# Patient Record
Sex: FEMALE | Race: WHITE | URBAN - METROPOLITAN AREA
[De-identification: names, ages, dates, MRNs, and addresses within clinical notes are randomized per-mention and may not be internally consistent; named-entity substitution may affect disease eponyms.]

---

## 2023-02-28 ENCOUNTER — OFFICE VISIT (OUTPATIENT)
Dept: URBAN - METROPOLITAN AREA CLINIC 113 | Facility: CLINIC | Age: 53
End: 2023-02-28
Payer: COMMERCIAL

## 2023-02-28 ENCOUNTER — WEB ENCOUNTER (OUTPATIENT)
Dept: URBAN - METROPOLITAN AREA CLINIC 113 | Facility: CLINIC | Age: 53
End: 2023-02-28

## 2023-02-28 VITALS
BODY MASS INDEX: 17.58 KG/M2 | RESPIRATION RATE: 18 BRPM | HEIGHT: 64 IN | HEART RATE: 58 BPM | DIASTOLIC BLOOD PRESSURE: 74 MMHG | SYSTOLIC BLOOD PRESSURE: 114 MMHG | WEIGHT: 103 LBS | TEMPERATURE: 97.5 F

## 2023-02-28 DIAGNOSIS — R76.8 POSITIVE ANA (ANTINUCLEAR ANTIBODY): ICD-10-CM

## 2023-02-28 DIAGNOSIS — Z80.0 FAMILY HISTORY OF COLON CANCER: ICD-10-CM

## 2023-02-28 DIAGNOSIS — K58.0 IRRITABLE BOWEL SYNDROME WITH DIARRHEA: ICD-10-CM

## 2023-02-28 DIAGNOSIS — K76.0 NAFLD (NONALCOHOLIC FATTY LIVER DISEASE): ICD-10-CM

## 2023-02-28 DIAGNOSIS — R76.8 ANTIMITOCHONDRIAL ANTIBODY POSITIVE: ICD-10-CM

## 2023-02-28 PROBLEM — 1231824009: Status: ACTIVE | Noted: 2023-02-28

## 2023-02-28 PROBLEM — 197125005: Status: ACTIVE | Noted: 2023-02-28

## 2023-02-28 PROCEDURE — 76700 US EXAM ABDOM COMPLETE: CPT

## 2023-02-28 PROCEDURE — 99204 OFFICE O/P NEW MOD 45 MIN: CPT

## 2023-02-28 PROCEDURE — 99204 OFFICE O/P NEW MOD 45 MIN: CPT | Performed by: INTERNAL MEDICINE

## 2023-02-28 PROCEDURE — 76700 US EXAM ABDOM COMPLETE: CPT | Performed by: INTERNAL MEDICINE

## 2023-02-28 RX ORDER — HYDROXYCHLOROQUINE SULFATE 200 MG/1
AS DIRECTED TABLET, FILM COATED ORAL
Status: ACTIVE | COMMUNITY

## 2023-02-28 NOTE — HPI-TODAY'S VISIT:
52 year old female with a history of antiphospholipid syndrome, IBS and NAFLD presents for establishment of care.  She is a chronic disease . She visits patient's at their home and aids in chronic disease management with exercise. She was supposed to have a Hep B vaccination prior to moving to [place]. She had a positive Hep B surface antibody. She is concerned she was exposed at some point prior to moving however her Hep B core Ab is negative. She does have NAFLD. She barely drinks ETOH. She runs marathons. She "has the celiac gene but tests negative for celiac disease." She feels better when she eats a gluten free diet. She does have elevated IgM levels. She has low C3 and C4 levels. C3 was corrected with hydroxychloroquine. She has had "random" lab and urinalyses abnormalities. She has had positive ZAIN and positive AMA as well. She was told this is all attributed to lupus. She was established with a GI in [place]. She lives in [facility] currently. She has recently established care with a rheumatologist in [place] ([facility]).   She does have IBS-D. THis does not affect her quality of life. Fiber "kills her stomach." Tums and Meloxicam upset her stomach as well. She does have abdominal pain daily but not debilitating. This is generalized and may worsen after consuming FODMAP food.. She has occasional nausea without vomiting. Denies heartburn. She has occasional dysphagia to solid foods, no liquids. No odynophagia. Again this is not bothersome to her. Denies blood per rectum or melena. Her maternal uncle  of colon cancer, diagnosed at age 45. Her mother had diverticulitis in the small bowel. She tested negative for Tarango syndrome. Her father was diagnosed with esophageal cancer at age 80. Denies a family history of IBD. She has had many colonoscopies since she was 26 years old. She undergoes colonoscopies every five years. Her last EGD and colonoscopy were performed together in 2019. These were unremarkable. She saw a GI specialist in [place]  as well who performed EGD and colonoscopy which was unremarkable. She has a history of antiphospholipid syndrome. She needs to establish with heme-onc.

## 2023-02-28 NOTE — PHYSICAL EXAM GASTROINTESTINAL
soft, mild tenderness to palpation of RLQ without rebound or guarding, nondistended , normal bowel sounds

## 2023-03-01 LAB
IMMUNOGLOBULIN A, QN, SERUM: 152
INTERPRETATION: (no result)
T-TRANSGLUTAMINASE (TTG) IGA: <1

## 2023-03-10 ENCOUNTER — TELEPHONE ENCOUNTER (OUTPATIENT)
Dept: URBAN - METROPOLITAN AREA CLINIC 113 | Facility: CLINIC | Age: 53
End: 2023-03-10

## 2023-03-16 ENCOUNTER — WEB ENCOUNTER (OUTPATIENT)
Dept: URBAN - METROPOLITAN AREA CLINIC 113 | Facility: CLINIC | Age: 53
End: 2023-03-16

## 2023-04-27 ENCOUNTER — CLAIMS CREATED FROM THE CLAIM WINDOW (OUTPATIENT)
Dept: URBAN - METROPOLITAN AREA CLINIC 72 | Facility: CLINIC | Age: 53
End: 2023-04-27
Payer: COMMERCIAL

## 2023-04-27 ENCOUNTER — WEB ENCOUNTER (OUTPATIENT)
Dept: URBAN - METROPOLITAN AREA CLINIC 72 | Facility: CLINIC | Age: 53
End: 2023-04-27

## 2023-04-27 ENCOUNTER — LAB OUTSIDE AN ENCOUNTER (OUTPATIENT)
Dept: URBAN - METROPOLITAN AREA CLINIC 72 | Facility: CLINIC | Age: 53
End: 2023-04-27

## 2023-04-27 VITALS
WEIGHT: 102 LBS | BODY MASS INDEX: 17.42 KG/M2 | HEIGHT: 64 IN | HEART RATE: 64 BPM | DIASTOLIC BLOOD PRESSURE: 60 MMHG | SYSTOLIC BLOOD PRESSURE: 83 MMHG | TEMPERATURE: 97.5 F

## 2023-04-27 DIAGNOSIS — R10.13 EPIGASTRIC PAIN: ICD-10-CM

## 2023-04-27 DIAGNOSIS — R11.14 BILIOUS VOMITING WITH NAUSEA: ICD-10-CM

## 2023-04-27 DIAGNOSIS — K58.0 IRRITABLE BOWEL SYNDROME WITH DIARRHEA: ICD-10-CM

## 2023-04-27 PROCEDURE — 99214 OFFICE O/P EST MOD 30 MIN: CPT | Performed by: INTERNAL MEDICINE

## 2023-04-27 PROCEDURE — 99214 OFFICE O/P EST MOD 30 MIN: CPT | Performed by: NURSE PRACTITIONER

## 2023-04-27 RX ORDER — HYDROXYCHLOROQUINE SULFATE 200 MG/1
AS DIRECTED TABLET, FILM COATED ORAL
Status: ACTIVE | COMMUNITY

## 2023-04-27 RX ORDER — ESOMEPRAZOLE MAGNESIUM 20 MG/1
1 CAPSULE 30 MINUTES BEFORE BREAKFAST ON AN EMPTY STOMACH CAPSULE, DELAYED RELEASE ORAL ONCE A DAY
Qty: 30 | Refills: 1 | OUTPATIENT
Start: 2023-04-27

## 2023-04-27 NOTE — HPI-TODAY'S VISIT:
52-year-old female here for a follow-up appointment.  Was last seen 23 for IBS requesting liver tests with questionable history of nonalcoholic fatty liver disease.  She declined antispasmodic medication.  Has had multiple colonoscopies in the past.  Celiac panel ordered for prior positive testing of celiac gene but negative for celiac disease.  She is due for surveillance colonoscopy .  Right upper quadrant ultrasound with elastography ordered.  Right upper quadrant ultrasound with elastography 3/14/2023 was normal.  She is a chronic disease . She visits patient's at their home and aids in chronic disease management with exercise. She was supposed to have a Hep B vaccination prior to moving to [place]. She had a positive Hep B surface antibody. She is concerned she was exposed at some point prior to moving however her Hep B core Ab is negative. She does have NAFLD. She barely drinks ETOH. She runs marathons. She "has the celiac gene but tests negative for celiac disease." She feels better when she eats a gluten free diet. She does have elevated IgM levels. She has low C3 and C4 levels. C3 was corrected with hydroxychloroquine. She has had "random" lab and urinalyses abnormalities. She has had positive ZAIN and positive AMA as well. She was told this is all attributed to lupus. She was established with a GI in [place]. She lives in [facility] currently. She has recently established care with a rheumatologist in [place] ([facility]).   She does have IBS-D. THis does not affect her quality of life. Fiber "kills her stomach." Tums and Meloxicam upset her stomach as well. She does have abdominal pain daily but not debilitating. This is generalized and may worsen after consuming FODMAP food.. She has occasional nausea without vomiting. Denies heartburn. She has occasional dysphagia to solid foods, no liquids. No odynophagia. Again this is not bothersome to her. Denies blood per rectum or melena. Her maternal uncle  of colon cancer, diagnosed at age 45. Her mother had diverticulitis in the small bowel. She tested negative for Tarango syndrome. Her father was diagnosed with esophageal cancer at age 80. Denies a family history of IBD. She has had many colonoscopies since she was 26 years old. She undergoes colonoscopies every five years. Her last EGD and colonoscopy were performed together in 2019. These were unremarkable. She saw a GI specialist in [place]  as well who performed EGD and colonoscopy which was unremarkable. She has a history of antiphospholipid syndrome. She needs to establish with heme-onc.  On interterview today she reports on  she had LUQ pain, she woke up with it.  Had 2 episodes of diarrhea and then eneded up vomiting bile.  Slept 18-20 hours, not able to eat and was still having some pain even with water.  She is feeling better but pain is still present and is having some nausea.  No further vomiting or hematemesis.  No melena or hematochezia.  Stools are similar to her prior IBS-D symptoms.  Had a fever over the weekend.  Rare ETOH use.  Denies NSAIDs.  No GERD.

## 2023-04-27 NOTE — PHYSICAL EXAM GASTROINTESTINAL
soft, epigastric and LUQ tenderness, nondistended , normal bowel sounds, no guarding or rigidity, no rebound tenderness

## 2023-05-01 ENCOUNTER — TELEPHONE ENCOUNTER (OUTPATIENT)
Dept: URBAN - METROPOLITAN AREA CLINIC 72 | Facility: CLINIC | Age: 53
End: 2023-05-01

## 2023-05-02 ENCOUNTER — OFFICE VISIT (OUTPATIENT)
Dept: URBAN - METROPOLITAN AREA CLINIC 72 | Facility: CLINIC | Age: 53
End: 2023-05-02

## 2023-05-02 ENCOUNTER — TELEPHONE ENCOUNTER (OUTPATIENT)
Dept: URBAN - METROPOLITAN AREA CLINIC 72 | Facility: CLINIC | Age: 53
End: 2023-05-02

## 2023-05-04 ENCOUNTER — WEB ENCOUNTER (OUTPATIENT)
Dept: URBAN - METROPOLITAN AREA CLINIC 72 | Facility: CLINIC | Age: 53
End: 2023-05-04

## 2023-05-06 ENCOUNTER — WEB ENCOUNTER (OUTPATIENT)
Dept: URBAN - METROPOLITAN AREA CLINIC 72 | Facility: CLINIC | Age: 53
End: 2023-05-06

## 2023-05-08 ENCOUNTER — TELEPHONE ENCOUNTER (OUTPATIENT)
Dept: URBAN - METROPOLITAN AREA CLINIC 72 | Facility: CLINIC | Age: 53
End: 2023-05-08

## 2023-05-09 ENCOUNTER — WEB ENCOUNTER (OUTPATIENT)
Dept: URBAN - METROPOLITAN AREA CLINIC 72 | Facility: CLINIC | Age: 53
End: 2023-05-09

## 2023-05-12 ENCOUNTER — TELEPHONE ENCOUNTER (OUTPATIENT)
Dept: URBAN - METROPOLITAN AREA CLINIC 23 | Facility: CLINIC | Age: 53
End: 2023-05-12

## 2023-10-24 ENCOUNTER — WEB ENCOUNTER (OUTPATIENT)
Dept: URBAN - METROPOLITAN AREA CLINIC 72 | Facility: CLINIC | Age: 53
End: 2023-10-24

## 2023-10-24 ENCOUNTER — OFFICE VISIT (OUTPATIENT)
Dept: URBAN - METROPOLITAN AREA CLINIC 72 | Facility: CLINIC | Age: 53
End: 2023-10-24

## 2023-11-07 ENCOUNTER — LAB OUTSIDE AN ENCOUNTER (OUTPATIENT)
Dept: URBAN - METROPOLITAN AREA CLINIC 72 | Facility: CLINIC | Age: 53
End: 2023-11-07

## 2023-11-07 ENCOUNTER — DASHBOARD ENCOUNTERS (OUTPATIENT)
Age: 53
End: 2023-11-07

## 2023-11-07 ENCOUNTER — OFFICE VISIT (OUTPATIENT)
Dept: URBAN - METROPOLITAN AREA CLINIC 72 | Facility: CLINIC | Age: 53
End: 2023-11-07
Payer: COMMERCIAL

## 2023-11-07 VITALS
TEMPERATURE: 97.7 F | DIASTOLIC BLOOD PRESSURE: 64 MMHG | HEART RATE: 55 BPM | SYSTOLIC BLOOD PRESSURE: 87 MMHG | BODY MASS INDEX: 17.58 KG/M2 | HEIGHT: 64 IN | WEIGHT: 103 LBS

## 2023-11-07 DIAGNOSIS — K58.0 IRRITABLE BOWEL SYNDROME WITH DIARRHEA: ICD-10-CM

## 2023-11-07 DIAGNOSIS — R10.12 LEFT UPPER QUADRANT PAIN: ICD-10-CM

## 2023-11-07 DIAGNOSIS — R39.89 BLADDER PAIN: ICD-10-CM

## 2023-11-07 DIAGNOSIS — R10.13 EPIGASTRIC PAIN: ICD-10-CM

## 2023-11-07 PROCEDURE — 99214 OFFICE O/P EST MOD 30 MIN: CPT | Performed by: INTERNAL MEDICINE

## 2023-11-07 RX ORDER — ESOMEPRAZOLE MAGNESIUM 20 MG/1
1 CAPSULE 30 MINUTES BEFORE BREAKFAST ON AN EMPTY STOMACH CAPSULE, DELAYED RELEASE ORAL ONCE A DAY
Qty: 30 | Refills: 1 | Status: ON HOLD | COMMUNITY
Start: 2023-04-27

## 2023-11-07 RX ORDER — SOD SULF/POT CHLORIDE/MAG SULF 1.479 G
12 TABLETS THE FIRST DOSE THE EVENING BEFORE AND SECOND DOSE THE MORNING OF COLONOSCOPY TABLET ORAL TWICE A DAY
Qty: 24 | OUTPATIENT
Start: 2023-11-07 | End: 2023-11-08

## 2023-11-07 RX ORDER — HYDROXYCHLOROQUINE SULFATE 200 MG/1
AS DIRECTED TABLET, FILM COATED ORAL
Status: ACTIVE | COMMUNITY

## 2023-11-07 NOTE — HPI-OTHER HISTORIES
Imaging: -Right upper quadrant ultrasound with elastography 3/14/2023 was normal. -CT abdomen and pelvis with contrast 5/9/2023 revealed trace nonspecific free fluid in the pelvis otherwise no acute findings. Was provided with [facility] phone number to follow-up with  Labs: -4/27/2023. CBC normal with Hgb 14.4. CMP osmolality 275 otherwise normal. Lipase normal. CRP normal

## 2023-11-07 NOTE — HPI-TODAY'S VISIT:
53-year-old female here for a 6 month follow-up appointment.  She has a history of antiphospholipid syndrome. She "has the celiac gene but tests negative for celiac disease."   She does have elevated IgM levels. She has low C3 and C4 levels. C3 was corrected with hydroxychloroquine. She has had positive ZAIN and positive AMA as well. She was told this is all attributed to lupus  Last seen 2023 for epigastric pain, nausea and vomiting, IBS diarrhea and history of fatty liver with elevated LFTs.  Labs and CT ordered for further evaluation.  Started on Nexium 20 mg daily.  For nausea started on Zofran. Consider EGD if symptoms persist.  It was noted she is going to be out of state until September.  She is a chronic disease . She visits patient's at their home and aids in chronic disease management with exercise. She was supposed to have a Hep B vaccination prior to moving to [place]. She had a positive Hep B surface antibody. She is concerned she was exposed at some point prior to moving however her Hep B core Ab is negative. She does have NAFLD. She barely drinks ETOH. She runs marathons.  She feels better when she eats a gluten free diet. She was established with a GI in [place]. She lives in [facility] currently. She has recently established care with a rheumatologist in [place] ([facility]). She does have IBS-D. This does not affect her quality of life. Fiber "kills her stomach." Tums and Meloxicam upset her stomach as well. She does have abdominal pain daily but not debilitating. This is generalized and may worsen after consuming FODMAP food.. She has occasional nausea without vomiting. Denies heartburn. She has occasional dysphagia to solid foods, no liquids. No odynophagia. Again this is not bothersome to her. Denies blood per rectum or melena. Her maternal uncle  of colon cancer, diagnosed at age 45. Her mother had diverticulitis in the small bowel. She tested negative for Tarango syndrome. Her father was diagnosed with esophageal cancer at age 80. Denies a family history of IBD. She has had many colonoscopies since she was 26 years old. She undergoes colonoscopies every five years.   Her last EGD and colonoscopy were performed together in 2019. These were unremarkable. She saw a GI specialist in [place]  as well who performed EGD and colonoscopy which was unremarkable.  Still has some LUQ pain and loose stools. She did not try any of the medicine that was previously prescribed. Has seen gynecology since her CT.  Has some bladder discomfort chronic from a urinary catheter years ago.

## 2023-11-10 ENCOUNTER — WEB ENCOUNTER (OUTPATIENT)
Dept: URBAN - METROPOLITAN AREA CLINIC 72 | Facility: CLINIC | Age: 53
End: 2023-11-10

## 2024-03-08 ENCOUNTER — COL/EGD (OUTPATIENT)
Dept: URBAN - METROPOLITAN AREA MEDICAL CENTER 40 | Facility: MEDICAL CENTER | Age: 54
End: 2024-03-08